# Patient Record
Sex: MALE | Race: WHITE | NOT HISPANIC OR LATINO | URBAN - NONMETROPOLITAN AREA
[De-identification: names, ages, dates, MRNs, and addresses within clinical notes are randomized per-mention and may not be internally consistent; named-entity substitution may affect disease eponyms.]

---

## 2021-09-10 ENCOUNTER — IMPORTED ENCOUNTER (OUTPATIENT)
Dept: URBAN - NONMETROPOLITAN AREA CLINIC 1 | Facility: CLINIC | Age: 71
End: 2021-09-10

## 2021-09-10 PROBLEM — E11.9: Noted: 2021-09-10

## 2021-09-10 PROBLEM — H25.813: Noted: 2021-09-10

## 2021-09-10 PROBLEM — H40.013: Noted: 2021-09-10

## 2021-09-10 PROBLEM — H52.4: Noted: 2021-09-10

## 2021-09-10 PROCEDURE — 92014 COMPRE OPH EXAM EST PT 1/>: CPT

## 2021-09-10 PROCEDURE — 92133 CPTRZD OPH DX IMG PST SGM ON: CPT

## 2021-09-10 PROCEDURE — 76514 ECHO EXAM OF EYE THICKNESS: CPT

## 2021-09-10 PROCEDURE — 92015 DETERMINE REFRACTIVE STATE: CPT

## 2021-09-10 NOTE — PATIENT DISCUSSION
DM s -Stressed the importance of keeping blood sugars under control blood pressure under control and weight normalization and regular visits with PCP. -Explained the possible effects of poorly controlled diabetes and the damage that diabetes can cause to ocular health. -Patient to check HgbA1C.-Pt instructed to contact our office with any vision changes. Cataract OU-Not yet surgical. -Reviewed symptoms of advancing cataract growth such as glare and halos and decreased vision.-Continue to monitor for now. Pt will notify us if any new symptoms develop. Glaucoma Suspect-Based C/D age and IOP-Appears stable at this time.-Continue to monitor with exams and testing. Presbyopia-Rx issued; Dr's Notes: Dr. Erasmo Singleton
Statement Selected

## 2022-04-09 ASSESSMENT — TONOMETRY
OS_IOP_MMHG: 19
OD_IOP_MMHG: 18

## 2022-04-09 ASSESSMENT — VISUAL ACUITY
OS_SC: 20/20-2
OD_CC: J2
OS_CC: J1
OD_SC: 20/25+

## 2022-09-09 ENCOUNTER — ESTABLISHED PATIENT (OUTPATIENT)
Dept: RURAL CLINIC 2 | Facility: CLINIC | Age: 72
End: 2022-09-09

## 2022-09-09 DIAGNOSIS — H40.013: ICD-10-CM

## 2022-09-09 DIAGNOSIS — H25.813: ICD-10-CM

## 2022-09-09 DIAGNOSIS — H52.03: ICD-10-CM

## 2022-09-09 DIAGNOSIS — E11.9: ICD-10-CM

## 2022-09-09 PROCEDURE — 92133 CPTRZD OPH DX IMG PST SGM ON: CPT

## 2022-09-09 PROCEDURE — 92015 DETERMINE REFRACTIVE STATE: CPT

## 2022-09-09 PROCEDURE — 92014 COMPRE OPH EXAM EST PT 1/>: CPT

## 2022-09-09 ASSESSMENT — VISUAL ACUITY
OU_CC: J1
OD_CC: 20/20
OS_CC: 20/20-2

## 2022-09-09 ASSESSMENT — TONOMETRY
OD_IOP_MMHG: 15
OS_IOP_MMHG: 15

## 2023-09-11 ENCOUNTER — ESTABLISHED PATIENT (OUTPATIENT)
Dept: RURAL CLINIC 2 | Facility: CLINIC | Age: 73
End: 2023-09-11

## 2023-09-11 DIAGNOSIS — H52.03: ICD-10-CM

## 2023-09-11 DIAGNOSIS — H43.813: ICD-10-CM

## 2023-09-11 DIAGNOSIS — E11.9: ICD-10-CM

## 2023-09-11 DIAGNOSIS — H40.013: ICD-10-CM

## 2023-09-11 DIAGNOSIS — H52.223: ICD-10-CM

## 2023-09-11 DIAGNOSIS — H52.4: ICD-10-CM

## 2023-09-11 DIAGNOSIS — H25.813: ICD-10-CM

## 2023-09-11 PROCEDURE — 92014 COMPRE OPH EXAM EST PT 1/>: CPT

## 2023-09-11 PROCEDURE — 92015 DETERMINE REFRACTIVE STATE: CPT

## 2023-09-11 PROCEDURE — 92133 CPTRZD OPH DX IMG PST SGM ON: CPT

## 2023-09-11 ASSESSMENT — TONOMETRY
OD_IOP_MMHG: 16
OS_IOP_MMHG: 16

## 2023-09-11 ASSESSMENT — VISUAL ACUITY
OS_CC: 20/25-2
OD_CC: 20/25-2

## 2024-10-03 ENCOUNTER — COMPREHENSIVE EXAM (OUTPATIENT)
Dept: RURAL CLINIC 2 | Facility: CLINIC | Age: 74
End: 2024-10-03

## 2024-10-03 DIAGNOSIS — H43.813: ICD-10-CM

## 2024-10-03 DIAGNOSIS — H25.813: ICD-10-CM

## 2024-10-03 DIAGNOSIS — H52.03: ICD-10-CM

## 2024-10-03 DIAGNOSIS — E11.9: ICD-10-CM

## 2024-10-03 DIAGNOSIS — H52.223: ICD-10-CM

## 2024-10-03 DIAGNOSIS — H40.013: ICD-10-CM

## 2024-10-03 DIAGNOSIS — H52.4: ICD-10-CM

## 2024-10-03 PROCEDURE — 92015 DETERMINE REFRACTIVE STATE: CPT

## 2024-10-03 PROCEDURE — 92014 COMPRE OPH EXAM EST PT 1/>: CPT

## 2024-10-03 PROCEDURE — 92133 CPTRZD OPH DX IMG PST SGM ON: CPT
